# Patient Record
Sex: MALE | Race: ASIAN | NOT HISPANIC OR LATINO | URBAN - METROPOLITAN AREA
[De-identification: names, ages, dates, MRNs, and addresses within clinical notes are randomized per-mention and may not be internally consistent; named-entity substitution may affect disease eponyms.]

---

## 2019-06-25 ENCOUNTER — EMERGENCY (EMERGENCY)
Facility: HOSPITAL | Age: 39
LOS: 1 days | Discharge: HOME | End: 2019-06-25
Attending: EMERGENCY MEDICINE | Admitting: EMERGENCY MEDICINE
Payer: SELF-PAY

## 2019-06-25 VITALS
SYSTOLIC BLOOD PRESSURE: 155 MMHG | OXYGEN SATURATION: 97 % | RESPIRATION RATE: 16 BRPM | HEART RATE: 100 BPM | DIASTOLIC BLOOD PRESSURE: 103 MMHG | TEMPERATURE: 98 F

## 2019-06-25 PROCEDURE — 99283 EMERGENCY DEPT VISIT LOW MDM: CPT

## 2019-06-25 NOTE — ED ADULT TRIAGE NOTE - CHIEF COMPLAINT QUOTE
pt mainly Jhon speaking accepted free translations services arrives w/ c/o chest pain and diaphoresis. pt states he was walking for awhile when the pain began. pt states recently traveled from [place]. pt also c/o of cough that causing him to feel like he losing his breath. pt gvn mask in triage . pt states on montelukast for cough rxed from [place]. ekg in progress

## 2019-06-26 RX ORDER — ASPIRIN/CALCIUM CARB/MAGNESIUM 324 MG
162 TABLET ORAL ONCE
Refills: 0 | Status: DISCONTINUED | OUTPATIENT
Start: 2019-06-26 | End: 2019-06-29

## 2019-06-26 NOTE — ED PROVIDER NOTE - PROGRESS NOTE DETAILS
Jayy: attempted to find patient to use  phone in order to provide information for leaving AMA and our plan for him but patient had eloped.

## 2019-06-26 NOTE — ED PROVIDER NOTE - NS ED ROS FT
General: denies fever, chills, weight loss/weight gain.  HENT: denies nasal congestion, sore throat, rhinorrhea, ear pain  Eyes: denies visual changes, blurred vision, eye discharge, eye redness  Neck: denies neck pain, neck swelling  CV: See HPI  Resp: see HPI. +cough  Abdominal: denies nausea, vomiting, diarrhea, abdominal pain, blood in stool, dark stool  MSK: denies muscle aches, bony pain, leg pain, leg swelling  Neuro: denies headaches, numbness, tingling, dizziness, lightheadedness.  Skin: denies rashes, cuts, bruises  Hematologic: denies unexplained bruises

## 2019-06-26 NOTE — ED PROVIDER NOTE - OBJECTIVE STATEMENT
39 year old M no PMH, no personal or family cardiac history presenting with 2 episodes of chest pain, lasting 5 minutes that started when patient was walking. He felt sweaty and had shortness of breath. He thinks this is anxiety. Pain was midsternal, squeezing in nature. States he had an EKG done 2 months ago in [place] that was normal. Never had a stress test. Patient also states he has intermittent syncope with coughing which he has seen his doctor for. He arrived from [place] 1 month ago and is going back in 3 days. No leg swelling, abdominal pain, nausea, or vomiting.

## 2019-06-26 NOTE — ED PROVIDER NOTE - ATTENDING CONTRIBUTION TO CARE
pt presenting Jhon speaking declining professional translation services and preferring family.  States he had an short episode of chest pain described as a tightness/squeezing while walking earlier tody associated with SOB.  Resolved on its own PTA.  Never had CP before    Gen: Well appearing in NAD  Head: NC/AT  Neck: trachea midline  Resp:  No distress  Ext: no deformities  Neuro:  A&O appears non focal  Skin:  Warm and dry as visualized  Psych:  Normal affect and mood     Pt with episode of CP and SOB.  Occurred at exertion so concern for an ACS.  Has a number of risk factors.  Will do labs.  Pt will warrant CDU vs tele admit for formal workup    Pt does not want to stay.  Will attempt professional  to ensure proper comprehension of risks prior to leaving AMA

## 2019-06-26 NOTE — ED PROVIDER NOTE - CLINICAL SUMMARY MEDICAL DECISION MAKING FREE TEXT BOX
39 year old M with no PMH presenting with chest pain concerning for ACS. Explained this to patient and the family member that is translating this, and the patient does not want to stay for any blood work. Explained to him that we would need to repeat blood work in an hour after and he does not want to stay for the initial results. He does not want to be admitted for observation. He understands that 39 year old M with no PMH presenting with chest pain concerning for ACS. Explained this to patient and the family member that is translating this, and the patient does not want to stay for any blood work. Explained to him that we would need to repeat blood work in an hour after and he does not want to stay for the initial results. He does not want to be admitted for observation. He wanted family member to translate for him. Will use  to re-explain everything.

## 2019-06-26 NOTE — ED ADULT NURSE NOTE - NS ED NURSE DISCH DISPOSITION
Left without being seen (saw a nurse but never saw a physician or midlevel provider) Eloped (saw a physician/midlevel provider but left without telling anyone)

## 2019-06-26 NOTE — ED PROVIDER NOTE - PHYSICAL EXAMINATION
General appearance: NAD, conversant, afebrile    Eyes: anicteric sclerae, moist conjunctivae; no lid-lag; Extraocular muscles intact   HENT: Atraumatic; oropharynx clear with moist mucous membranes and no mucosal ulcerations; normal hard and soft palate; no pharyngeal erythema or exudate   Neck: Trachea midline; Full range of motion, supple; no thyromegaly or lymphadenopathy   Pulm: Lungs clear to auscultation bilaterally, with normal respiratory effort and no intercostal retractions; normal work of breathing   CV: Regular Rhythm and Rate; Normal S1, S2; No murmurs, rubs, or gallops. 2+ peripheral pulses.   Abdomen: Soft, non-tender, non-distended; no masses or hepatosplenomegaly.   Extremities: No peripheral edema or extremity lymphadenopathy. 5/5 strength in all four extremities.   Skin: Normal temperature, turgor and texture; no rash, ulcers or subcutaneous nodules   Psych: Appropriate affect, cooperative; alert and oriented to person, place and time